# Patient Record
Sex: FEMALE
[De-identification: names, ages, dates, MRNs, and addresses within clinical notes are randomized per-mention and may not be internally consistent; named-entity substitution may affect disease eponyms.]

---

## 2019-07-09 ENCOUNTER — HOSPITAL ENCOUNTER (EMERGENCY)
Dept: HOSPITAL 97 - ER | Age: 34
Discharge: HOME | End: 2019-07-09
Payer: SELF-PAY

## 2019-07-09 DIAGNOSIS — R07.9: Primary | ICD-10-CM

## 2019-07-09 LAB
BUN BLD-MCNC: 8 MG/DL (ref 7–18)
GLUCOSE SERPLBLD-MCNC: 89 MG/DL (ref 74–106)
HCT VFR BLD CALC: 40.6 % (ref 36–45)
LYMPHOCYTES # SPEC AUTO: 2.8 K/UL (ref 0.7–4.9)
PMV BLD: 7.9 FL (ref 7.6–11.3)
POTASSIUM SERPL-SCNC: 3.6 MMOL/L (ref 3.5–5.1)
RBC # BLD: 4.63 M/UL (ref 3.86–4.86)
TROPONIN (EMERG DEPT USE ONLY): < 0.02 NG/ML (ref 0–0.04)
UA COMPLETE W REFLEX CULTURE PNL UR: (no result)

## 2019-07-09 PROCEDURE — 85025 COMPLETE CBC W/AUTO DIFF WBC: CPT

## 2019-07-09 PROCEDURE — 71045 X-RAY EXAM CHEST 1 VIEW: CPT

## 2019-07-09 PROCEDURE — 99285 EMERGENCY DEPT VISIT HI MDM: CPT

## 2019-07-09 PROCEDURE — 96374 THER/PROPH/DIAG INJ IV PUSH: CPT

## 2019-07-09 PROCEDURE — 96361 HYDRATE IV INFUSION ADD-ON: CPT

## 2019-07-09 PROCEDURE — 80048 BASIC METABOLIC PNL TOTAL CA: CPT

## 2019-07-09 PROCEDURE — 81015 MICROSCOPIC EXAM OF URINE: CPT

## 2019-07-09 PROCEDURE — 81025 URINE PREGNANCY TEST: CPT

## 2019-07-09 PROCEDURE — 87088 URINE BACTERIA CULTURE: CPT

## 2019-07-09 PROCEDURE — 93005 ELECTROCARDIOGRAM TRACING: CPT

## 2019-07-09 PROCEDURE — 85379 FIBRIN DEGRADATION QUANT: CPT

## 2019-07-09 PROCEDURE — 81003 URINALYSIS AUTO W/O SCOPE: CPT

## 2019-07-09 PROCEDURE — 87086 URINE CULTURE/COLONY COUNT: CPT

## 2019-07-09 PROCEDURE — 36415 COLL VENOUS BLD VENIPUNCTURE: CPT

## 2019-07-09 PROCEDURE — 84484 ASSAY OF TROPONIN QUANT: CPT

## 2019-07-09 NOTE — EDPHYS
Physician Documentation                                                                           

 [facility]                                                                 

Name: Susan Pereira                                                                             

Age: 33 yrs                                                                                       

Sex: Female                                                                                       

: 1985                                                                                   

MRN: [id number]                                                                                   

Arrival Date: 2019                                                                          

Time: 10:36                                                                                       

Account#: [id number]                                                                            

Bed 17                                                                                            

Private MD:                                                                                       

ED Physician Toribio Reynoso                                                                       

HPI:                                                                                              

                                                                                             

17:10 This 33 yrs old  Female presents to ER via Wheelchair with complaints of Chest  gs  

      Pain.                                                                                       

17:10 The patient or guardian reports chest pain that is located primarily in the anterior    gs  

      chest wall. The pain does not radiate. Associated signs and symptoms: Pertinent             

      negatives: shortness of breath. The chest pain is described as sharp. Duration: The         

      patient or guardian reports a single episode, that is still ongoing. Modifying factors:     

      the symptoms are aggravated by deep breath, movement, twisting torso. Severity of pain:     

      At its worst the pain was severe in the emergency department the pain has improved          

      markedly. The patient has not experienced similar symptoms in the past. The patient has     

      not recently seen a physician.                                                              

                                                                                                  

OB/GYN:                                                                                           

10:55 LMP N/A - Irregular menses                                                              ak1 

                                                                                                  

Historical:                                                                                       

- Allergies:                                                                                      

10:56 No Known Allergies;                                                                     ak1 

- Home Meds:                                                                                      

10:56 None [Active];                                                                          ak1 

- PMHx:                                                                                           

10:56 None;                                                                                   ak1 

                                                                                                  

- Immunization history:: Adult Immunizations.                                                     

- Social history:: Smoking status: Patient/guardian denies using tobacco.                         

- Ebola Screening: : No symptoms or risks identified at this time.                                

                                                                                                  

                                                                                                  

ROS:                                                                                              

17:10 All other systems are negative.                                                         gs  

                                                                                                  

Exam:                                                                                             

17:10 Head/Face:  Normocephalic, atraumatic. Eyes:  Pupils equal round and reactive to light, gs  

      extra-ocular motions intact.  Lids and lashes normal.  Conjunctiva and sclera are           

      non-icteric and not injected.  Cornea within normal limits.  Periorbital areas with no      

      swelling, redness, or edema. ENT:  Nares patent. No nasal discharge, no septal              

      abnormalities noted.  Tympanic membranes are normal and external auditory canals are        

      clear.  Oropharynx with no redness, swelling, or masses, exudates, or evidence of           

      obstruction, uvula midline.  Mucous membranes moist. Neck:  Trachea midline, no             

      thyromegaly or masses palpated, and no cervical lymphadenopathy.  Supple, full range of     

      motion without nuchal rigidity, or vertebral point tenderness.  No Meningismus.             

      Chest/axilla:  Normal chest wall appearance and motion.  Nontender with no deformity.       

      No lesions are appreciated. Cardiovascular:  Regular rate and rhythm with a normal S1       

      and S2.  No gallops, murmurs, or rubs.  Normal PMI, no JVD.  No pulse deficits.             

      Respiratory:  Lungs have equal breath sounds bilaterally, clear to auscultation and         

      percussion.  No rales, rhonchi or wheezes noted.  No increased work of breathing, no        

      retractions or nasal flaring. Abdomen/GI:  Soft, non-tender, with normal bowel sounds.      

      No distension or tympany.  No guarding or rebound.  No evidence of tenderness               

      throughout. Back:  No spinal tenderness.  No costovertebral tenderness.  Full range of      

      motion. Skin:  Warm, dry with normal turgor.  Normal color with no rashes, no lesions,      

      and no evidence of cellulitis. MS/ Extremity:  Pulses equal, no cyanosis.                   

      Neurovascular intact.  Full, normal range of motion. Neuro:  Awake and alert, GCS 15,       

      oriented to person, place, time, and situation.  Cranial nerves II-XII grossly intact.      

      Motor strength 5/5 in all extremities.  Sensory grossly intact.  Cerebellar exam            

      normal.  Normal gait.                                                                       

17:10 Constitutional: The patient appears alert, awake.                                           

17:10 ECG was reviewed by the Attending Physician.                                                

                                                                                                  

Vital Signs:                                                                                      

10:55  / 69; Pulse 90; Resp 16; Temp 98; Pulse Ox 98% ; Weight 81.65 kg; Height 5 ft. 5 ak1 

      in. (165.10 cm);                                                                            

13:17  / 65; Pulse 77; Resp 16 S; Temp 98.0(TE); Pulse Ox 100% on R/A; Pain 5/10;       aa5 

10:55 Body Mass Index 29.95 (81.65 kg, 165.10 cm)                                             ak1 

                                                                                                  

MDM:                                                                                              

11:16 Patient medically screened.                                                             gs  

17:10 Differential diagnosis: acute myocardial infarction, coronary artery disease chest wall gs  

      pain, pulmonary embolus. Data reviewed: vital signs, nurses notes, lab test result(s),      

      EKG, radiologic studies. Counseling: I had a detailed discussion with the patient           

      and/or guardian regarding: the historical points, exam findings, and any diagnostic         

      results supporting the discharge/admit diagnosis, the presence of at least one elevated     

      blood pressure reading (>120/80) during this emergency department visit. Response to        

      treatment: the patient's symptoms have markedly improved after treatment, the patient's     

      symptoms have resolved after treatment, the patient's pain is gone, the patient's           

      condition has returned to base line.                                                        

17:16 HEART Score: History: Slightly Suspicious (0), ECG: Normal (0), Age: < or = 45 years    gs  

      (0), Risk Factors: No Risk Factors Known (0), Troponin: < or = 1 x Normal Limit (0).        

                                                                                                  

                                                                                             

11:06 Order name: Basic Metabolic Panel; Complete Time: 13:16                                   

                                                                                             

11:06 Order name: CBC with Diff                                                                 

                                                                                             

11:06 Order name: Troponin (emerg Dept Use Only); Complete Time: 13:16                          

                                                                                             

11:06 Order name: Urine Microscopic Only; Complete Time: 13:16                                  

                                                                                             

11:07 Order name: D-Dimer                                                                       

                                                                                             

11:33 Order name: Urine Dipstick--Ancillary (enter results)                                   ms  

                                                                                             

11:06 Order name: XRAY Chest (1 view)                                                           

                                                                                             

11:33 Order name: Urine Pregnancy--Ancillary (enter results)                                  ms  

                                                                                             

11:39 Order name: CBC with Automated Diff                                                     [facility]

                                                                                             

11:42 Order name: Chest Single View; Complete Time: 13:16                                     [facility]

                                                                                             

11:42 Order name: Urine Pregnancy--Ancillary; Complete Time: 13:16                            [facility]

                                                                                             

11:42 Order name: Urine Dipstick-Ancillary; Complete Time: 13:16                              [facility]

                                                                                             

12:35 Order name: Urine Culture                                                               [facility]

                                                                                             

11:06 Order name: EKG; Complete Time: 11:21                                                     

                                                                                             

11:06 Order name: Cardiac monitoring; Complete Time: 11:                                      

                                                                                             

11:06 Order name: EKG - Nurse/Tech; Complete Time: 11:                                        

                                                                                             

11:06 Order name: IV Saline Lock; Complete Time: 11:33                                          

                                                                                             

11:06 Order name: Labs collected and sent; Complete Time: 11:33                                 

                                                                                             

11:06 Order name: O2 Per Protocol; Complete Time: 11:                                         

                                                                                             

11:06 Order name: O2 Sat Monitoring; Complete Time: 11:                                       

                                                                                             

11:06 Order name: Urine Pregnancy Test (obtain specimen); Complete Time: 11:27                  

                                                                                             

11:06 Order name: Urine Dipstick-Ancillary (obtain specimen); Complete Time: :              

                                                                                                  

EC:10 Rate is 78 beats/min. Rhythm is regular. NC interval is normal. QRS interval is normal. gs  

      No Q waves. T waves are Normal. Clinical impression: Normal ECG. Interpreted by me.         

                                                                                                  

Administered Medications:                                                                         

: Drug: NS 0.9% 1000 ml Route: IV; Rate: 1 bolus; Site: right antecubital;                aa5 

13:00 Follow up: IV Status: Completed infusion; IV Intake: 1000ml                             aa5 

13:32 Drug: TORadol - Ketorolac 15 mg Route: IVP; Site: right antecubital;                    aa5 

13:45 Follow up: Response: No adverse reaction                                                aa5 

                                                                                                  

                                                                                                  

Disposition:                                                                                      

19 13:18 Discharged to Home. Impression: Chest pain, unspecified.                           

- Condition is Stable.                                                                            

- Discharge Instructions: Nonspecific Chest Pain.                                                 

                                                                                                  

- Medication Reconciliation Form, Thank You Letter, Antibiotic Education, Prescription            

  Opioid Use, Family Work Release form.                                                           

- Follow up: Private Physician; When: 2 - 3 days; Reason: Re-evaluation by your                   

  physician. Follow up: Elmo Nj MD; When: 2 - 3 days; Reason: Re-evaluation by             

  your physician.                                                                                 

                                                                                                  

                                                                                                  

                                                                                                  

Signatures:                                                                                       

Dispatcher [employer]                           EDMS                                                 

Syl Pimentel RN                     RN   aa5                                                  

Tiara Cortez RN                       RN   ak1                                                  

Toribio Reynoso MD MD                                                      

Elliot Frazier RN                   RN   tr5                                                  

                                                                                                  

Corrections: (The following items were deleted from the chart)                                    

13:18 13:18 2019 13:18 Discharged to Home. Impression: Chest pain, unspecified.         gs  

      Condition is Stable. Forms are Family Work Release, Medication Reconciliation Form,         

      Thank You Letter, Antibiotic Education, Prescription Opioid Use. Follow up: Private         

      Physician; When: 2 - 3 days; Reason: Re-evaluation by your physician.                     

13:57 13:18 2019 13:18 Discharged to Home. Impression: Chest pain, unspecified.         tr5 

      Condition is Stable. Discharge Instructions: Nonspecific Chest Pain. Forms are Family       

      Work Release, Medication Reconciliation Form, Thank You Letter, Antibiotic Education,       

      Prescription Opioid Use. Follow up: Private Physician; When: 2 - 3 days; Reason:            

      Re-evaluation by your physician. Follow up: Elmo Nj; When: 2 - 3 days; Reason:         

      Re-evaluation by your physician. gs                                                         

                                                                                                  

**************************************************************************************************

## 2019-07-09 NOTE — EKG
Test Date:    2019-07-09               Test Time:    11:06:17

Technician:   DAVID                                     

                                                     

MEASUREMENT RESULTS:                                       

Intervals:                                           

Rate:         78                                     

AR:           170                                    

QRSD:         84                                     

QT:           374                                    

QTc:          426                                    

Axis:                                                

P:            22                                     

AR:           170                                    

QRS:          16                                     

T:            39                                     

                                                     

INTERPRETIVE STATEMENTS:                                       

                                                     

Normal sinus rhythm

Normal ECG

No previous ECG available for comparison



Electronically Signed On 07-09-19 12:28:23 CDT by Al Almeida

## 2019-07-09 NOTE — RAD REPORT
EXAM DESCRIPTION:  Nakul Single View7/9/2019 11:39 am

 

CLINICAL HISTORY:  Chest pain

 

COMPARISON:  none

 

FINDINGS:   The lungs appear clear of acute infiltrate. The heart is normal size

 

IMPRESSION:   No acute abnormalities displayed

## 2019-07-09 NOTE — ER
Nurse's Notes                                                                                     

 [facility]                                                                 

Name: Susan Pereira                                                                             

Age: 33 yrs                                                                                       

Sex: Female                                                                                       

: 1985                                                                                   

MRN: [id number]                                                                                   

Arrival Date: 2019                                                                          

Time: 10:36                                                                                       

Account#: [id number]                                                                            

Bed 17                                                                                            

Private MD:                                                                                       

Diagnosis: Chest pain, unspecified                                                                

                                                                                                  

Presentation:                                                                                     

                                                                                             

10:54 Presenting complaint: Patient states: "SHE PASS OUT WHEN SHE GOT  YESTERDAY AND  ak1 

      LAY ON THE GROUND FOR LIKE 20 MINUTES. AND NOW SHE SAY SHE TOO WEAK AND HER HEART           

      HURTS, LIKE SHARP. AND SHE DON'T WANNA GET UP OR MOVE AND SHE GET TOO TIRED.".              

      Transition of care: patient was not received from another setting of care. Onset of         

      symptoms is unknown. Risk Assessment: Do you want to hurt yourself or someone else?         

      Patient reports no desire to harm self or others. Initial Sepsis Screen: Does the           

      patient meet any 2 criteria? No. Patient's initial sepsis screen is negative. Does the      

      patient have a suspected source of infection? No. Patient's initial sepsis screen is        

      negative. Care prior to arrival: None.                                                      

10:54 Method Of Arrival: Wheelchair                                                           ak1 

10:54 Acuity: KECIA 3                                                                           ak1 

                                                                                                  

OB/GYN:                                                                                           

10:55 LMP N/A - Irregular menses                                                              ak1 

                                                                                                  

Historical:                                                                                       

- Allergies:                                                                                      

10:56 No Known Allergies;                                                                     ak1 

- Home Meds:                                                                                      

10:56 None [Active];                                                                          ak1 

- PMHx:                                                                                           

10:56 None;                                                                                   ak1 

                                                                                                  

- Immunization history:: Adult Immunizations.                                                     

- Social history:: Smoking status: Patient/guardian denies using tobacco.                         

- Ebola Screening: : No symptoms or risks identified at this time.                                

                                                                                                  

                                                                                                  

Screenin:09 Abuse screen: Denies threats or abuse. Nutritional screening: No deficits noted.        aa5 

      Tuberculosis screening: No symptoms or risk factors identified. Fall Risk None              

      identified.                                                                                 

                                                                                                  

Assessment:                                                                                       

11:02 General: Appears comfortable, Behavior is calm, cooperative. Pain: Complains of pain in aa5 

      mid-sternal area Pain does not radiate. Pain currently is 8 out of 10 on a pain scale.      

      Quality of pain is described as sharp, Pain began 1 day ago. Is continuous. Neuro:          

      Level of Consciousness is awake, alert, obeys commands, Oriented to person, place,          

      time, situation,  are equal bilaterally Moves all extremities. Speech is normal,       

      Facial symmetry appears normal, Pupils are PERRLA, Reports generalized weakness. .          

      Cardiovascular: Reports chest pain, Heart tones S1 S2 present Rhythm is sinus rhythm.       

      Respiratory: Airway is patent Respiratory effort is even, unlabored, Respiratory            

      pattern is regular, symmetrical, Breath sounds are clear bilaterally. Denies cough,         

      shortness of breath. GI: Abdomen is round non-distended, Bowel sounds present X 4           

      quads. Abd is soft and non tender X 4 quads. Patient currently denies diarrhea, nausea,     

      vomiting. : No signs and/or symptoms were reported regarding the genitourinary            

      system. EENT: No signs and/or symptoms were reported regarding the EENT system. Derm:       

      Skin is pink, warm \T\ dry. Musculoskeletal: Range of motion: intact in all extremities.    

11:16 Reassessment: Dr. Reynoso at bedside. Pt's  states "she was really stressed last   aa5 

      night because they called the  on her son and it was too much for her to handle".       

      Pt started crying when asked what she was doing when the chest pain began. .                

11:33 Reassessment: Patient is alert, oriented x 3, equal unlabored respirations, skin        aa5 

      warm/dry/pink. X-ray completed. Warm blanket provided for comfort. .                        

13:45 Reassessment: Patient is alert, oriented x 3, equal unlabored respirations, skin        aa5 

      warm/dry/pink.                                                                              

                                                                                                  

Vital Signs:                                                                                      

10:55  / 69; Pulse 90; Resp 16; Temp 98; Pulse Ox 98% ; Weight 81.65 kg; Height 5 ft. 5 ak1 

      in. (165.10 cm);                                                                            

13:17  / 65; Pulse 77; Resp 16 S; Temp 98.0(TE); Pulse Ox 100% on R/A; Pain 5/10;       aa5 

10:55 Body Mass Index 29.95 (81.65 kg, 165.10 cm)                                             ak1 

                                                                                                  

ED Course:                                                                                        

10:36 Patient arrived in ED.                                                                  as  

10:55 Triage completed.                                                                       ak1 

10:56 Arm band placed on right wrist.                                                         ak1 

10:59 Syl Pimentel, RN is Primary Nurse.                                                   aa5 

10:59 Syl Pimentel, RN is Primary Nurse.                                                   aa5 

10:59 Toribio Reynoso MD is Attending Physician.                                              gs  

11:02 Patient has correct armband on for positive identification. Placed in gown. Bed in low  aa5 

      position. Call light in reach. Side rails up X2. Cardiac monitor on. Pulse ox on. NIBP      

      on.                                                                                         

11:09 Patient maintains SpO2 saturation greater than 95% on room air.                         aa5 

11:25 Initial lab(s) drawn, by me, sent to lab. Inserted saline lock: 20 gauge in right       aa5 

      antecubital area, using aseptic technique. Blood collected.                                 

11:26 Urine collected: clean catch specimen, clear.                                           em1 

11:36 X-ray completed. Portable x-ray completed in exam room. Patient tolerated procedure     jb2 

      well.                                                                                       

11:52 Chest Single View In Process Unspecified.                                               EDMS

12:03 EKG done, by EKG tech. reviewed by Toribio Reynoso MD.                                    dt2 

13:00 No provider procedures requiring assistance completed.                                  aa5 

13:18 Elmo Nj MD is Referral Physician.                                                  

                                                                                                  

Administered Medications:                                                                         

11:27 Drug: NS 0.9% 1000 ml Route: IV; Rate: 1 bolus; Site: right antecubital;                aa5 

13:00 Follow up: IV Status: Completed infusion; IV Intake: 1000ml                             aa5 

13:32 Drug: TORadol - Ketorolac 15 mg Route: IVP; Site: right antecubital;                    aa5 

13:45 Follow up: Response: No adverse reaction                                                aa5 

                                                                                                  

                                                                                                  

Intake:                                                                                           

13:00 IV: 1000ml; Total: 1000ml.                                                              aa5 

                                                                                                  

Outcome:                                                                                          

13:18 Discharge ordered by MD.                                                                  

13:57 Discharged to home                                                                      tr5 

13:57 Condition: stable                                                                           

13:57 Discharge instructions given to patient, Instructed on discharge instructions, follow       

      up and referral plans.                                                                      

13:57 Patient left the ED.                                                                    tr5 

                                                                                                  

Signatures:                                                                                       

Dispatcher [employer]                           EDMS                                                 

Chilo Garcia                              jb2                                                  

Ashley Tobias Eric                               em1                                                  

Syl Pimentel, RN                     RN   aa5                                                  

Tiara Cortez, RN                       RN   ak1                                                  

Toribio Reynoos MD MD                                                      

Tracie Ponce                             dt2                                                  

Elliot Frazier RN                   RN   tr5                                                  

                                                                                                  

Corrections: (The following items were deleted from the chart)                                    

10:59 10:54 Tiaar Cortez RN is Primary Nurse. ak1                                           aa5 

10:59 10:59 Primary Nurse role handed off by Tiara Cortez RN aa5                            aa5 

17:19 17:17  / 65; Pulse 77bpm; Resp 16bpm; Spontaneous; Pulse Ox 100% RA; Temp 98.0F   aa5 

      Temporal; Pain 5/10; aa5                                                                    

17:19 11:02 Pain: Complains of pain in mid-sternal area Pain does not radiate. Pain Quality   aa5 

      of pain is described as sharp, Pain began 1 day ago. Is continuous, aa5                     

                                                                                                  

**************************************************************************************************